# Patient Record
Sex: FEMALE | URBAN - METROPOLITAN AREA
[De-identification: names, ages, dates, MRNs, and addresses within clinical notes are randomized per-mention and may not be internally consistent; named-entity substitution may affect disease eponyms.]

---

## 2022-03-18 PROBLEM — R63.5 WEIGHT GAIN: Status: ACTIVE | Noted: 2018-02-14

## 2022-06-01 DIAGNOSIS — E03.9 PRIMARY HYPOTHYROIDISM: Primary | ICD-10-CM

## 2022-11-10 RX ORDER — LEVOTHYROXINE SODIUM 0.05 MG/1
50 TABLET ORAL
Qty: 30 TABLET | Refills: 5 | Status: SHIPPED | OUTPATIENT
Start: 2022-11-10

## 2022-11-14 RX ORDER — LEVOTHYROXINE SODIUM 0.05 MG/1
TABLET ORAL
Qty: 30 TABLET | Refills: 0 | OUTPATIENT
Start: 2022-11-14

## 2023-05-01 RX ORDER — LEVOTHYROXINE SODIUM 0.05 MG/1
TABLET ORAL
Qty: 30 TABLET | Refills: 0 | OUTPATIENT
Start: 2023-05-01

## 2023-06-26 RX ORDER — LEVOTHYROXINE SODIUM 0.05 MG/1
50 TABLET ORAL
Qty: 30 TABLET | Refills: 1 | Status: SHIPPED | OUTPATIENT
Start: 2023-06-26

## 2023-07-11 DIAGNOSIS — E03.9 PRIMARY HYPOTHYROIDISM: Primary | ICD-10-CM

## 2023-07-17 RX ORDER — LEVOTHYROXINE SODIUM 0.05 MG/1
50 TABLET ORAL
Qty: 30 TABLET | Refills: 1 | Status: SHIPPED | OUTPATIENT
Start: 2023-07-17

## 2023-07-17 NOTE — TELEPHONE ENCOUNTER
Patient called needing a refill. Patient stated that she wont have enough before follow up appointment. [facility] in [place].

## 2023-08-09 ENCOUNTER — OFFICE VISIT (OUTPATIENT)
Dept: ENDOCRINOLOGY | Age: 48
End: 2023-08-09
Payer: COMMERCIAL

## 2023-08-09 VITALS
DIASTOLIC BLOOD PRESSURE: 86 MMHG | BODY MASS INDEX: 32.96 KG/M2 | WEIGHT: 192 LBS | SYSTOLIC BLOOD PRESSURE: 134 MMHG | HEART RATE: 66 BPM

## 2023-08-09 DIAGNOSIS — E03.9 PRIMARY HYPOTHYROIDISM: Primary | ICD-10-CM

## 2023-08-09 PROCEDURE — 99213 OFFICE O/P EST LOW 20 MIN: CPT | Performed by: INTERNAL MEDICINE

## 2023-08-09 RX ORDER — ANASTROZOLE 1 MG/1
1 TABLET ORAL DAILY
COMMUNITY
Start: 2023-07-14

## 2023-08-09 RX ORDER — BUSPIRONE HYDROCHLORIDE 5 MG/1
1 TABLET ORAL 2 TIMES DAILY
COMMUNITY
Start: 2023-07-25

## 2023-08-09 RX ORDER — LEVOTHYROXINE SODIUM 0.05 MG/1
50 TABLET ORAL
Qty: 90 TABLET | Refills: 3 | Status: SHIPPED | OUTPATIENT
Start: 2023-08-09

## 2023-08-09 ASSESSMENT — ENCOUNTER SYMPTOMS
TROUBLE SWALLOWING: 0
VOICE CHANGE: 0
DIARRHEA: 1
CONSTIPATION: 1

## 2024-07-25 DIAGNOSIS — E03.9 PRIMARY HYPOTHYROIDISM: ICD-10-CM

## 2024-07-25 RX ORDER — LEVOTHYROXINE SODIUM 50 UG/1
TABLET ORAL
Qty: 90 TABLET | Refills: 0 | OUTPATIENT
Start: 2024-07-25

## 2024-08-09 ENCOUNTER — OFFICE VISIT (OUTPATIENT)
Dept: ENDOCRINOLOGY | Age: 49
End: 2024-08-09
Payer: COMMERCIAL

## 2024-08-09 VITALS
HEART RATE: 82 BPM | OXYGEN SATURATION: 96 % | BODY MASS INDEX: 32.71 KG/M2 | HEIGHT: 64 IN | DIASTOLIC BLOOD PRESSURE: 86 MMHG | SYSTOLIC BLOOD PRESSURE: 128 MMHG | WEIGHT: 191.6 LBS

## 2024-08-09 DIAGNOSIS — E03.9 PRIMARY HYPOTHYROIDISM: Primary | ICD-10-CM

## 2024-08-09 PROCEDURE — 99213 OFFICE O/P EST LOW 20 MIN: CPT | Performed by: INTERNAL MEDICINE

## 2024-08-09 RX ORDER — LEVOTHYROXINE SODIUM 0.05 MG/1
50 TABLET ORAL
Qty: 90 TABLET | Refills: 3 | Status: SHIPPED | OUTPATIENT
Start: 2024-08-09

## 2024-08-09 ASSESSMENT — ENCOUNTER SYMPTOMS
TROUBLE SWALLOWING: 0
DIARRHEA: 1
CONSTIPATION: 1
VOICE CHANGE: 0

## 2024-08-09 NOTE — PROGRESS NOTES
TRE Arguelles MD, [facility] ENDOCRINOLOGY   AND   THYROID NODULE CLINIC            Reason for visit: Follow-up of hypothyroidism      ASSESSMENT AND PLAN:    1. Primary hypothyroidism (preceding partial thyroidectomy in 2009)  Mariella Rachel is biochemically euthyroid.  Continue thyroid hormone replacement as prescribed.   Residual symptoms are attributable to something other than thyroid dysfunction.   Repeat thyroid function tests prior to the next appointment.  - levothyroxine (SYNTHROID) 50 MCG tablet; Take 1 tablet by mouth every morning (before breakfast)  Dispense: 90 tablet; Refill: 3  - TSH; Future  - T4, Free; Future      Follow-up and Dispositions    Return in about 1 year (around 8/9/2025).         History of Present Illness:    THYROID DYSFUNCTION  Rachna Rachel was diagnosed with hypothyroidism in approximately 2008.  She underwent left thyroid lobectomy in 2009 for an enlarging nodule (final pathology benign). She was previously under the care of Dr. Garcia.  The patient does not have a family history of thyroid cancer.     Symptoms: See review of systems below     Treatment: She has been on levothyroxine since diagnosis (50 mcg daily since approximately 2009 or 2010).    Pertinent labs:  12/7/2012: TSH 1.430.   9/30/2013: TSH 0.906.  2/7/2014: TSH 0.890.  8/12/2014: TSH 0.969.   10/21/2014: TSH 0.619.  7/12/2015: TSH 0.60.  1/12/2016: TSH 0.935.  12/28/2016:  TSH 0.68, free T4 0.68.  11/14/2017:  TSH 1.029.  1/30/2018:  TSH 1.715.  3/23/2019: TSH 0.616.  6/13/2019: TSH 1.55.  5/18/2020: TSH 1.92, free T4 0.95.  10/1/2020: TSH 2.38, free T4 1.03.  4/8/2021: TSH 1.57, free T4 1.32.  9/27/2021: TSH 0.96, free T4 1.23.  4/7/2022: TSH 1.325, free T4 1.27.  11/4/2022: TSH 1.280.  7/25/2023: TSH 0.926, free T4 1.24.  7/9/2024: TSH 1.270, free T4 1.57.    Imaging: None     Review of Systems   Constitutional:  Positive for fatigue and unexpected weight change (gained 4 pounds in the last 16

## 2025-06-27 DIAGNOSIS — E03.9 PRIMARY HYPOTHYROIDISM: ICD-10-CM

## 2025-06-27 RX ORDER — LEVOTHYROXINE SODIUM 50 UG/1
TABLET ORAL
Qty: 90 TABLET | Refills: 0 | Status: SHIPPED | OUTPATIENT
Start: 2025-06-27

## 2025-06-27 NOTE — TELEPHONE ENCOUNTER
Maris response:    Good morning,    Dr. Arguelles is out of the office. I have refilled your 50mcg generic levothyroxine to [facility] in [place].    Please obtain the ordered thyroid labs BEFORE your appointment on 8/12    Take care,  ~Scarlett

## 2025-08-12 ENCOUNTER — OFFICE VISIT (OUTPATIENT)
Dept: ENDOCRINOLOGY | Age: 50
End: 2025-08-12
Payer: COMMERCIAL

## 2025-08-12 VITALS
BODY MASS INDEX: 35.85 KG/M2 | OXYGEN SATURATION: 97 % | SYSTOLIC BLOOD PRESSURE: 115 MMHG | DIASTOLIC BLOOD PRESSURE: 72 MMHG | HEIGHT: 64 IN | WEIGHT: 210 LBS | HEART RATE: 82 BPM

## 2025-08-12 DIAGNOSIS — E03.9 PRIMARY HYPOTHYROIDISM: Primary | ICD-10-CM

## 2025-08-12 PROCEDURE — 99213 OFFICE O/P EST LOW 20 MIN: CPT | Performed by: INTERNAL MEDICINE

## 2025-08-12 RX ORDER — LEVOTHYROXINE SODIUM 50 UG/1
50 TABLET ORAL DAILY
Qty: 90 TABLET | Refills: 3 | Status: SHIPPED | OUTPATIENT
Start: 2025-08-12

## 2025-08-12 ASSESSMENT — ENCOUNTER SYMPTOMS
TROUBLE SWALLOWING: 0
DIARRHEA: 1
CONSTIPATION: 0
VOICE CHANGE: 0